# Patient Record
Sex: FEMALE | Race: BLACK OR AFRICAN AMERICAN | Employment: UNEMPLOYED | URBAN - METROPOLITAN AREA
[De-identification: names, ages, dates, MRNs, and addresses within clinical notes are randomized per-mention and may not be internally consistent; named-entity substitution may affect disease eponyms.]

---

## 2019-06-20 ENCOUNTER — HOSPITAL ENCOUNTER (EMERGENCY)
Age: 24
Discharge: HOME | End: 2019-06-20
Attending: EMERGENCY MEDICINE
Payer: COMMERCIAL

## 2019-06-20 VITALS
HEIGHT: 64 IN | RESPIRATION RATE: 19 BRPM | TEMPERATURE: 99 F | SYSTOLIC BLOOD PRESSURE: 118 MMHG | DIASTOLIC BLOOD PRESSURE: 78 MMHG | BODY MASS INDEX: 30.73 KG/M2 | OXYGEN SATURATION: 95 % | WEIGHT: 180 LBS | HEART RATE: 94 BPM

## 2019-06-20 DIAGNOSIS — R52 INFLAMMATORY PAIN: Primary | ICD-10-CM

## 2019-06-20 LAB
ALBUMIN SERPL-MCNC: 3.5 G/DL (ref 3.5–5)
ALBUMIN/GLOB SERPL: 0.6 {RATIO} (ref 1.2–3.5)
ALP SERPL-CCNC: 54 U/L (ref 50–136)
ALT SERPL-CCNC: 27 U/L (ref 12–65)
ANION GAP SERPL CALC-SCNC: 6 MMOL/L (ref 7–16)
AST SERPL-CCNC: 31 U/L (ref 15–37)
BASOPHILS # BLD: 0 K/UL (ref 0–0.2)
BASOPHILS NFR BLD: 1 % (ref 0–2)
BILIRUB SERPL-MCNC: 0.3 MG/DL (ref 0.2–1.1)
BUN SERPL-MCNC: 14 MG/DL (ref 6–23)
CALCIUM SERPL-MCNC: 9.3 MG/DL (ref 8.3–10.4)
CHLORIDE SERPL-SCNC: 101 MMOL/L (ref 98–107)
CK SERPL-CCNC: 66 U/L (ref 21–215)
CO2 SERPL-SCNC: 29 MMOL/L (ref 21–32)
CREAT SERPL-MCNC: 0.66 MG/DL (ref 0.6–1)
CRP SERPL-MCNC: 6.8 MG/DL (ref 0–0.9)
DIFFERENTIAL METHOD BLD: ABNORMAL
EOSINOPHIL # BLD: 0 K/UL (ref 0–0.8)
EOSINOPHIL NFR BLD: 1 % (ref 0.5–7.8)
ERYTHROCYTE [DISTWIDTH] IN BLOOD BY AUTOMATED COUNT: 11.5 % (ref 11.9–14.6)
GLOBULIN SER CALC-MCNC: 5.6 G/DL (ref 2.3–3.5)
GLUCOSE SERPL-MCNC: 90 MG/DL (ref 65–100)
HCG UR QL: NEGATIVE
HCT VFR BLD AUTO: 35.7 % (ref 35.8–46.3)
HGB BLD-MCNC: 11.9 G/DL (ref 11.7–15.4)
IMM GRANULOCYTES # BLD AUTO: 0 K/UL (ref 0–0.5)
IMM GRANULOCYTES NFR BLD AUTO: 1 % (ref 0–5)
LYMPHOCYTES # BLD: 1.2 K/UL (ref 0.5–4.6)
LYMPHOCYTES NFR BLD: 36 % (ref 13–44)
MCH RBC QN AUTO: 27.6 PG (ref 26.1–32.9)
MCHC RBC AUTO-ENTMCNC: 33.3 G/DL (ref 31.4–35)
MCV RBC AUTO: 82.8 FL (ref 79.6–97.8)
MONOCYTES # BLD: 0.3 K/UL (ref 0.1–1.3)
MONOCYTES NFR BLD: 9 % (ref 4–12)
NEUTS SEG # BLD: 1.8 K/UL (ref 1.7–8.2)
NEUTS SEG NFR BLD: 53 % (ref 43–78)
NRBC # BLD: 0 K/UL (ref 0–0.2)
PLATELET # BLD AUTO: 108 K/UL (ref 150–450)
PMV BLD AUTO: 12.1 FL (ref 9.4–12.3)
POTASSIUM SERPL-SCNC: 4.2 MMOL/L (ref 3.5–5.1)
PROT SERPL-MCNC: 9.1 G/DL (ref 6.3–8.2)
RBC # BLD AUTO: 4.31 M/UL (ref 4.05–5.2)
SODIUM SERPL-SCNC: 136 MMOL/L (ref 136–145)
TSH SERPL DL<=0.005 MIU/L-ACNC: 2.91 UIU/ML
WBC # BLD AUTO: 3.4 K/UL (ref 4.3–11.1)

## 2019-06-20 PROCEDURE — 84443 ASSAY THYROID STIM HORMONE: CPT

## 2019-06-20 PROCEDURE — 99284 EMERGENCY DEPT VISIT MOD MDM: CPT | Performed by: EMERGENCY MEDICINE

## 2019-06-20 PROCEDURE — 85025 COMPLETE CBC W/AUTO DIFF WBC: CPT

## 2019-06-20 PROCEDURE — 80053 COMPREHEN METABOLIC PANEL: CPT

## 2019-06-20 PROCEDURE — 82550 ASSAY OF CK (CPK): CPT

## 2019-06-20 PROCEDURE — 81003 URINALYSIS AUTO W/O SCOPE: CPT | Performed by: EMERGENCY MEDICINE

## 2019-06-20 PROCEDURE — 86140 C-REACTIVE PROTEIN: CPT

## 2019-06-20 PROCEDURE — 81025 URINE PREGNANCY TEST: CPT

## 2019-06-20 RX ORDER — PREDNISONE 10 MG/1
TABLET ORAL
Qty: 21 TAB | Refills: 0 | Status: SHIPPED | OUTPATIENT
Start: 2019-06-20

## 2019-06-20 NOTE — ED TRIAGE NOTES
Pt c/o body aches, hand swelling and a UTI. She states her hands have been swelling on and off for one month. She states [address] did CMP on Monday and it was normal. She states the body aches have been going on for same amount of time. She noticed the UTI on Monday but has not been seen for that.

## 2019-06-20 NOTE — ED PROVIDER NOTES
Patient presents complaining of swelling of the hands it's been going on intermittently for the past 1-2 months. She now reports also generalized myalgias in association with this. She states the swelling seems to be worse in the morning and gets better as the day goes on. She does have occasional nocturnal symptoms of paresthesias of the hand but believes that his due to her sleeping position. She works as a medical assistant and does not have any significant repetitive use injuries involving the upper extremities to suggest carpal tunnel syndrome. There is no family history of rheumatological diseases and review of systems is otherwise negative    The history is provided by the patient. Hand Swelling    This is a chronic problem. The current episode started more than 1 week ago. The problem occurs daily. The problem has not changed since onset. Pain location: generalized. The quality of the pain is described as aching. The pain is at a severity of 6/10. The pain is moderate. Pertinent negatives include no numbness, full range of motion, no stiffness, no tingling, no itching, no back pain and no neck pain. She has tried nothing for the symptoms. The treatment provided no relief. There has been no history of extremity trauma. History reviewed. No pertinent past medical history. History reviewed. No pertinent surgical history. History reviewed. No pertinent family history.     Social History     Socioeconomic History    Marital status: UNKNOWN     Spouse name: Not on file    Number of children: Not on file    Years of education: Not on file    Highest education level: Not on file   Occupational History    Not on file   Social Needs    Financial resource strain: Not on file    Food insecurity:     Worry: Not on file     Inability: Not on file    Transportation needs:     Medical: Not on file     Non-medical: Not on file   Tobacco Use    Smoking status: Never Smoker    Smokeless tobacco: Never Used   Substance and Sexual Activity    Alcohol use: Yes     Comment: occ    Drug use: No    Sexual activity: Not Currently   Lifestyle    Physical activity:     Days per week: Not on file     Minutes per session: Not on file    Stress: Not on file   Relationships    Social connections:     Talks on phone: Not on file     Gets together: Not on file     Attends [employer] service: Not on file     Active member of club or organization: Not on file     Attends meetings of clubs or organizations: Not on file     Relationship status: Not on file    Intimate partner violence:     Fear of current or ex partner: Not on file     Emotionally abused: Not on file     Physically abused: Not on file     Forced sexual activity: Not on file   Other Topics Concern    Not on file   Social History Narrative    Not on file         ALLERGIES: Patient has no known allergies. Review of Systems   Musculoskeletal: Negative for back pain, neck pain and stiffness. Skin: Negative for itching. Neurological: Negative for tingling and numbness. All other systems reviewed and are negative. Vitals:    06/20/19 0816   BP: 125/82   Pulse: 95   Resp: 16   Temp: 99.1 °F (37.3 °C)   SpO2: 93%   Weight: 81.6 kg (180 lb)   Height: 5' 4\" (1.626 m)            Physical Exam   Constitutional: She is oriented to person, place, and time. She appears well-developed and well-nourished. No distress. HENT:   Head: Normocephalic and atraumatic. Eyes: Pupils are equal, round, and reactive to light. Conjunctivae and EOM are normal.   Neck: Normal range of motion. Neck supple. Cardiovascular: Normal rate and regular rhythm. Pulmonary/Chest: Effort normal.   Abdominal: Soft. Bowel sounds are normal.   Musculoskeletal: Normal range of motion. She exhibits edema. She exhibits no tenderness or deformity. Neurological: She is alert and oriented to person, place, and time. Skin: Skin is warm and dry.  Capillary refill takes less than 2 seconds. She is not diaphoretic. Psychiatric: She has a normal mood and affect. Her behavior is normal.   Nursing note and vitals reviewed.        MDM  Number of Diagnoses or Management Options     Amount and/or Complexity of Data Reviewed  Clinical lab tests: ordered and reviewed    Risk of Complications, Morbidity, and/or Mortality  Presenting problems: moderate  Diagnostic procedures: moderate  Management options: moderate    Patient Progress  Patient progress: stable         Procedures

## 2019-06-20 NOTE — LETTER
[facility] EMERGENCY DEPT 
[address] [place] [address] 
[phone] Work/School Note Date: 6/20/2019 To Whom It May concern: 
 
Enoc Bowen was seen and treated today in the emergency room by the following provider(s): 
Attending Provider: Irving Baires DO. Enoc Bowen may return to work on 6/22/19.  
 
Sincerely, 
 
 
 
 
Jonatan Mtz RN

## 2019-06-20 NOTE — ED NOTES
I have reviewed discharge instructions with the patient. The patient verbalized understanding. Patient left ED via Discharge Method: ambulatory to Home with (family). Opportunity for questions and clarification provided. Patient given 1 scripts. To continue your aftercare when you leave the hospital, you may receive an automated call from our care team to check in on how you are doing. This is a free service and part of our promise to provide the best care and service to meet your aftercare needs.  If you have questions, or wish to unsubscribe from this service please call [phone]. Thank you for Choosing our [facility] Emergency Department.

## 2019-06-20 NOTE — DISCHARGE INSTRUCTIONS
Patient Education        Learning About Managing Acute Pain at Home  What is a pain management plan? A pain management plan spells out ways you can deal with your pain at home. You and your care team will create this plan before you leave the hospital. The plan may include:  · The goals of your treatment. This may include how you can expect your pain and function to improve. · The treatments your doctor suggests for your pain. These may include medicines, physical therapy, or relaxation exercises. · Notes about how you and your care team will work together as you recover. Your team may include your doctor, a physical therapist, and an occupational therapist.  · A review of your treatment goals for pain and function. Your feelings about how you want to manage your pain are important. Be open and honest when you talk with your doctor. This will help ensure that you get a plan that is safe and that works best for you. Why is it important to follow your plan? After you have an injury or surgery, a certain amount of pain is common and normal. But you can manage your pain after you leave the hospital.  The best way to do that is to follow your pain management plan. This will help keep you comfortable and able to do the things you want to do. It can also speed your recovery and help reduce the risk of problems. What are the side effects of pain medicines? All pain medicines--like acetaminophen, nonsteroidal anti-inflammatory drugs, and opioids--have side effects. They can include allergic reaction, rash, and upset stomach. Common side effects of opioids also include constipation and nausea. More serious effects include needing larger doses over time, getting sick if you suddenly stop taking the drug, addiction, and death. How do you manage pain after you leave the hospital?  After you leave the hospital, the best way to benefit from your treatment is to take good care of yourself.  Here are some ways to do that.  · Try nonmedical ways to relieve pain. These ways include breathing exercises, progressive muscle relaxation, yoga, meditation, and massage. · Take your medicines or other treatments exactly as prescribed. Let your doctor know if your pain isn't getting better. · Pace yourself. It might be hard to take it easy when you get home. But even simple activities can increase pain at first. Follow your doctor's instructions about when you can be active again and any activities you should avoid. When you do start getting back to your regular activities, start slowly. · Arrange your home to help you recover. Here are some ideas:  ? Remove throw rugs to prevent falling. ? Sleep close to the bathroom, or have a commode near your bed.  ? Have pillows near you so you can sit or lie in a comfortable position. · Use tools that may help. Some devices may help you do your daily activities and be more mobile. These devices include walking canes, crutches, grab bars, and reachers. · Try heat or cold. Heat can soothe muscle pain and other aches. Cold can help with swelling. · Get support. Friends and relatives often want to help but don't know what to do. Let them know what you need. It will make them happy and will help you. How do you take opioids safely? Opioids can help you manage pain. But they can easily be misused. Misuse can lead to more problems like addiction and even death. Because of this, it is best to get off them as soon as possible. As soon as you don't need them, talk to your doctor about how to safely stop taking them. If you need to take opioids to manage pain, this advice can help you stay safe. · Take opioids exactly as directed. Follow the directions carefully. It's easy to misuse opioids if you take a dose other than what's prescribed by your doctor. Even sharing them with someone they were not meant for is misuse. · Do not drive or operate machinery.    Opioids may affect your judgment and decision making. Talk with your doctor about when it is safe to drive. · Avoid alcohol, sleeping medicines, and muscle relaxers. Opioids can be dangerous if you take them with alcohol or with certain drugs. This includes over-the-counter medicines. Make sure your doctor knows about all the other medicines you take. Don't start any new medicines before you talk to your doctor or pharmacist.  · Ask your doctor about a naloxone rescue kit. It can help you--and even save your life--if you take too much of an opioid. How do you safely store opioid pills and patches? It's important to store opioids safely so that they aren't used by the wrong person. Your pain medicine is only for you to take. If someone else takes your medicine, it can harm that person. You can safely store your medicine. Follow these tips. · Store pills and patches up high and out of sight. ? Keep them away from children and pets. ? Return the container to the same place each time you take your medicine. · Try locking your opioid medicine in a cabinet. · Make sure the bottles are closed tightly. If they have a safety cap, make sure that it's locked. Tighten the cap until you hear a click or can't twist it anymore. · Keep track of how many pills or patches you have left. You may want to keep track in a notebook. · Let the people who live with you know about your medicine. ? Tell them that it is only for you to take. ? If guests have opioid medicine with them, ask them to keep it safe. How do you get rid of opioid pills and patches safely? If you have opioid pills or patches that you aren't going to use, get rid of them right away. It's also important to get rid of used opioid patches. When you get rid of these medicines safely, you take away any chance that a person or an animal might get sick from one of them. Follow one of these steps. If you can't do the first step, then take the next step.   · Bring them to a Drug Enforcement Administration (FLOR)-authorized medicine take-back program or drop-off box. ? Your local trash and recycle center, pharmacy, or hospital may offer one of these. · Throw the medicines in the trash. Take this step if you can't get to a take-back program or drop-off box and the medicine's instructions do not have specific disposal information. ? Take the medicine out of its container. ? Mix it with something that tastes bad, like cat litter or coffee grounds. ? Place the mixture in a sealed plastic bag and put the bag in your household trash. · Flush them down the sink or toilet. ? You can flush your medicine down the toilet or sink only if you can't go to a FLOR-approved site or if your medicine's instructions specifically say to.  ? If you are throwing away a patch, first fold the sticky sides together. ? To see a list of medicines that should be flushed, go to: www.fda.gov/Drugs/ResourcesForYou/Consumers/BuyingUsingMedicineSafely/EnsuringSafeUseofMedicine/SafeDisposalofMedicines/zsv713621.htm. Follow-up care is a key part of your treatment and safety. Be sure to make and go to all appointments, and call your doctor if you are having problems. It's also a good idea to know your test results and keep a list of the medicines you take. Where can you learn more? Go to http://garth-akil.info/. Enter P175 in the search box to learn more about \"Learning About Managing Acute Pain at Home. \"  Current as of: Eleanor 3, 2018  Content Version: 11.9  © 1211-0721 [employer]. Care instructions adapted under license by [employer] (which disclaims liability or warranty for this information). If you have questions about a medical condition or this instruction, always ask your healthcare professional. [address] any warranty or liability for your use of this information.